# Patient Record
Sex: FEMALE | Race: WHITE | NOT HISPANIC OR LATINO | URBAN - METROPOLITAN AREA
[De-identification: names, ages, dates, MRNs, and addresses within clinical notes are randomized per-mention and may not be internally consistent; named-entity substitution may affect disease eponyms.]

---

## 2017-05-11 NOTE — PATIENT DISCUSSION
DRY EYES : Discussed with patient the importance of keeping the eye moist and the symptoms associated with dry eyes including blurry vision, tearing, burning, and pablo sensation. Advised patient to minimize use of any fans blowing directly on the face. Advised patient to continue with artificial tears 2-3 times daily.

## 2017-05-11 NOTE — PATIENT DISCUSSION
DRY EYES : Discussed with patient the importance of keeping the eye moist and the symptoms associated with dry eyes including blurry vision, tearing, burning, and pablo sensation. Tear Lab was performed today to evaluate the severity of dryness. Advised patient to minimize use of any fans blowing directly on the face. Advised patient to continue with over the counter artificial tears 2-3 times daily.

## 2017-12-07 ENCOUNTER — IMPORTED ENCOUNTER (OUTPATIENT)
Dept: URBAN - METROPOLITAN AREA CLINIC 31 | Facility: CLINIC | Age: 67
End: 2017-12-07

## 2017-12-07 PROBLEM — H25.13: Noted: 2017-12-07

## 2017-12-07 PROBLEM — E11.9: Noted: 2017-12-07

## 2017-12-07 PROCEDURE — 92015 DETERMINE REFRACTIVE STATE: CPT

## 2017-12-07 PROCEDURE — 92014 COMPRE OPH EXAM EST PT 1/>: CPT

## 2017-12-07 PROCEDURE — 76514 ECHO EXAM OF EYE THICKNESS: CPT

## 2017-12-07 NOTE — PATIENT DISCUSSION
1.  Refractive error2. DM II without sign of Diabetic Retinopathy OU:  Discussed the pathophysiology of diabetes and its effect on the eye. Stressed the importance of regular followup and good control of BS BP and Lipids to avoid future complications. 3. 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 4. Return for an appointment in 1 year for comprehensive exam. with Dr. Daphne Pritchett.

## 2018-04-30 NOTE — PATIENT DISCUSSION
Surgery  Counseling: I have discussed the option of glasses versus cataract surgery versus following. It was explained that when vision no longer meets the patient's visual needs and a new prescription for glasses is not likely to improve the patient's visual symptoms, the option of cataract surgery is a reasonable next step. It was explained that there is no guarantee that removing the cataract will improve their visual symptoms, however; it is believed that the cataract is contributing to the patient's visual impairment and surgery may significantly improve both the visual and functional status of the patient. The risks, benefits and alternatives of surgery were discussed with the patient. After this discussion, the patient desires to proceed with cataract surgery with implantation of an intraocular lens to improve vision for reading small print, reducing glare at night while driving and begin able to read small print.

## 2018-04-30 NOTE — PATIENT DISCUSSION
GLAUCOMA SUSPECT, OU OPTIC NERVE CUPPING/ASYMMETRY. IOP WNL , OU. OCT NEXT VISIT TO CHECK FOR RNFL THINNING. VISUAL FIELD NEXT VISIT TO CHECK FOR VISUAL FIELD LOSS.

## 2018-04-30 NOTE — PATIENT DISCUSSION
CATARACT, OU -VISUALLY SIGNIFICANT OU. SCHEDULE SX OD THEN LATER IN OS IF VISUAL SYMPTOMS PERSIST.  GLS RX GIVEN TO FILL IF DESIRES IN THE EVENT PT DOES NOT PROCEED W/ SX.

## 2018-04-30 NOTE — PATIENT DISCUSSION
Laterality Counseling: It has been explained that in the event cataract surgery is medically indicated in both eyes and each eye is equally visually significant the patient is offered to choose which eye he/she would prefer to be operated on first. If the patient does not have a preference, the surgery on the dominant eye will be performed first as a standard of care.

## 2018-06-07 NOTE — PATIENT DISCUSSION
Surgery  Counseling: I have discussed the option of glasses versus cataract surgery versus following . It was explained that when vision no longer meets the patient's visual needs and a new prescription for glasses is not likely to improve all of the patient's visual symptoms, the option of cataract surgery is a reasonable next step. It was explained that there is no guarantee that removing the cataract will improve their visual symptoms, however; it is believed that the cataract is contributing to the patient's visual impairment and surgery may significantly improve both the visual and functional status of the patient. The risks, benefits and alternatives of surgery were discussed with the patient. After this discussion, the patient desires to proceed with cataract surgery with implantation of an intraocular lens to improve vision to drive safely, read small print and watch TV. Joaquin David

## 2018-06-07 NOTE — PATIENT DISCUSSION
S/P PCIOL, OD- CONTINUE DROPS AS DIRECTED. OPERATIVE EYE IS STABLE AND OK TO PROCEED WITH THE FELLOW EYE'S SURGERY.

## 2018-06-07 NOTE — PATIENT DISCUSSION
***This patient had traditional cataract surgery performed. A monofocal IOL was placed to achieve a target refraction of plano (which should provide them with satisfactory distance vision with the aid of glasses/contact lenses). ***

## 2018-06-07 NOTE — PATIENT DISCUSSION
CATARACT, OS- VISUALLY SIGNIFICANT. FOLLOW UP WITH DR. ESCOTO TO [address] [facility] SX.

## 2019-02-27 ENCOUNTER — IMPORTED ENCOUNTER (OUTPATIENT)
Dept: URBAN - METROPOLITAN AREA CLINIC 31 | Facility: CLINIC | Age: 69
End: 2019-02-27

## 2019-02-27 PROBLEM — H25.13: Noted: 2019-02-27

## 2019-02-27 PROBLEM — H18.51: Noted: 2019-02-27

## 2019-02-27 PROCEDURE — 76514 ECHO EXAM OF EYE THICKNESS: CPT

## 2019-02-27 PROCEDURE — 92015 DETERMINE REFRACTIVE STATE: CPT

## 2019-02-27 PROCEDURE — 92014 COMPRE OPH EXAM EST PT 1/>: CPT

## 2019-02-27 NOTE — PATIENT DISCUSSION
1.  Discussed the risks benefits alternatives and limitations of cataract surgery including infection bleeding loss of vision retinal tears detachment. The patient stated a full understanding and a desire to proceed with the procedure in both eyes. Refractive options were reviewed. Patient has elected to be optimized for distance vision in both eyes. The patient will still need glasses for reading and to possibly fine tune distance vision. 2. Discussed the risks benefits and alternatives of DSEK/DMEK including infection bleeding loss of vision retineal tears detachment primary graft failure rejection and possible need for rebubble. Schedule if desired. 3. Refractive error - No glasses change. 4.  Return for an appointment in 1 month for cataract and cornea evaluation with Dr. Bubba Patterson.

## 2019-02-27 NOTE — PATIENT DISCUSSION
Discussed the risks benefits and alternatives of DSEK/DMEK including infection bleeding loss of vision retineal tears detachment primary graft failure rejection and possible need for rebubble. Schedule if desired.

## 2019-08-20 ENCOUNTER — IMPORTED ENCOUNTER (OUTPATIENT)
Dept: URBAN - METROPOLITAN AREA CLINIC 31 | Facility: CLINIC | Age: 69
End: 2019-08-20

## 2019-08-20 PROBLEM — H25.813: Noted: 2019-08-20

## 2019-08-20 PROBLEM — E11.9: Noted: 2019-08-20

## 2019-08-20 PROBLEM — H18.51: Noted: 2019-08-20

## 2019-08-20 PROCEDURE — 99214 OFFICE O/P EST MOD 30 MIN: CPT

## 2019-08-20 PROCEDURE — 92015 DETERMINE REFRACTIVE STATE: CPT

## 2019-08-20 PROCEDURE — 92286 ANT SGM IMG I&R SPECLR MIC: CPT

## 2019-08-20 NOTE — PATIENT DISCUSSION
1.  Discussed the risks benefits and alternatives of DSEK/DMEK including infection bleeding loss of vision retineal tears detachment primary graft failure rejection and possible need for rebubble. Schedule if desired. DMEK triple OD having issues with rapid pulse f/u with PCP/cardiology to ensure stable prior to surgery. Pore -0.752. Discussed the risks benefits alternatives and limitations of cataract surgery including infection bleeding loss of vision retinal tears detachment. The patient stated a full understanding and a desire to proceed with the procedure in both eyes. Refractive options were reviewed. Patient has elected to be optimized for distance vision in both eyes. The patient will still need glasses for reading and to possibly fine tune distance vision. 3.  DM II without sign of Diabetic Retinopathy OU:  Discussed the pathophysiology of diabetes and its effect on the eye. Stressed the importance of regular followup and good control of BS BP and Lipids to avoid future complications. 4. Return for an appointment for [address] [place] [address] with Dr. Tod Dupree.

## 2020-09-09 ENCOUNTER — IMPORTED ENCOUNTER (OUTPATIENT)
Dept: URBAN - METROPOLITAN AREA CLINIC 31 | Facility: CLINIC | Age: 70
End: 2020-09-09

## 2020-09-09 PROBLEM — E11.9: Noted: 2020-09-09

## 2020-09-09 PROBLEM — H25.811: Noted: 2020-09-09

## 2020-09-09 PROBLEM — H25.812: Noted: 2020-09-09

## 2020-09-09 PROBLEM — H18.51: Noted: 2020-09-09

## 2020-09-09 PROBLEM — H25.813: Noted: 2020-09-09

## 2020-09-09 PROBLEM — H18.231: Noted: 2020-09-09

## 2020-09-09 PROCEDURE — 92014 COMPRE OPH EXAM EST PT 1/>: CPT

## 2020-09-09 NOTE — PATIENT DISCUSSION
Fuchs Dystrophy OS: Recommend clinical observation. Advised use of Randy 128 drops in affected eye if worsening.

## 2020-09-09 NOTE — PATIENT DISCUSSION
1.  Fuchs Dyst OD : Discussed the risks benefits and alternatives of DSEK/DMEK including infection bleeding loss of vision retineal tears detachment primary graft failure rejection and possible need for rebubble. Schedule Triple DMEK ODPore -0.752. Combined Types of Cataract OD: Discussed the risks benefits alternatives and limitations of cataract surgery including infection bleeding loss of vision retinal tears detachment. The patient stated a full understanding and a desire to proceed with the procedure in the right eye. Refractive options were reviewed. Patient has elected to be optimized for distance vision in the right eye. The patient will still need glasses for reading and to fine tune distance vision. 3. Fuchs Dystrophy OS: Recommend clinical observation. Advised use of Randy 128 drops in affected eye if worsening. 4.  DM II without sign of Diabetic Retinopathy OU:  Discussed the pathophysiology of diabetes and its effect on the eye. Stressed the importance of regular followup and good control of BS BP and Lipids to avoid future complications. 5. Combined Types of Cataract OS: Explained how cataracts can effect vision. Recommend clinical observation. The patient was advised to contact us if any change or worsening of vision. 6. Followup for Admit. with Dr. Purnima Rodriguez

## 2020-09-18 ENCOUNTER — IMPORTED ENCOUNTER (OUTPATIENT)
Dept: URBAN - METROPOLITAN AREA CLINIC 31 | Facility: CLINIC | Age: 70
End: 2020-09-18

## 2020-09-18 PROBLEM — H18.51: Noted: 2020-09-18

## 2020-09-18 PROBLEM — H25.811: Noted: 2020-09-18

## 2020-09-18 PROCEDURE — 92136 OPHTHALMIC BIOMETRY: CPT

## 2020-09-18 NOTE — PATIENT DISCUSSION
1.  Discussed the risks benefits and alternatives of DSEK/DMEK including infection bleeding loss of vision retineal tears detachment primary graft failure rejection and possible need for rebubble. Schedule if desired. 2. Combined Types of Cataract OD: Discussed the risks benefits alternatives and limitations of cataract surgery including infection bleeding loss of vision retinal tears detachment. The patient stated a full understanding and a desire to proceed with the procedure in the right eye. Refractive options were reviewed. Patient has elected to be optimized for distance vision in the right eye. The patient will still need glasses for reading and to possibly fine tune distance vision.

## 2020-10-16 ENCOUNTER — IMPORTED ENCOUNTER (OUTPATIENT)
Dept: URBAN - METROPOLITAN AREA CLINIC 31 | Facility: CLINIC | Age: 70
End: 2020-10-16

## 2020-10-28 ENCOUNTER — IMPORTED ENCOUNTER (OUTPATIENT)
Dept: URBAN - METROPOLITAN AREA CLINIC 31 | Facility: CLINIC | Age: 70
End: 2020-10-28

## 2020-10-28 PROBLEM — Z96.1: Noted: 2020-10-28

## 2020-10-28 PROBLEM — Z94.7: Noted: 2020-10-28

## 2020-10-28 PROCEDURE — 99024 POSTOP FOLLOW-UP VISIT: CPT

## 2020-10-28 NOTE — PATIENT DISCUSSION
Post-Op Doing well no heavy lifting or straining glasses or shield at all times no eye rubbing po drops as directed. Call with any problems.

## 2020-10-28 NOTE — PATIENT DISCUSSION
Post-Op Day #1 - Cataract Surgery Right Eye (OD) - doing well. Tears prn. Continue postop drops as directed. Call office with symptoms of pain redness or decreased vision in operative eye. 1 drop tobramycin instilled.

## 2020-11-02 ENCOUNTER — IMPORTED ENCOUNTER (OUTPATIENT)
Dept: URBAN - METROPOLITAN AREA CLINIC 31 | Facility: CLINIC | Age: 70
End: 2020-11-02

## 2020-11-02 PROBLEM — Z94.7: Noted: 2020-11-02

## 2020-11-02 PROBLEM — Z96.1: Noted: 2020-11-02

## 2020-11-02 PROCEDURE — 92132 CPTRZD OPH DX IMG ANT SGM: CPT

## 2020-11-02 PROCEDURE — 99024 POSTOP FOLLOW-UP VISIT: CPT

## 2020-11-02 NOTE — PATIENT DISCUSSION
Return for an appointment in 4 days for post op exam. OCT anterior segment. with Dr. Azeem Contreras.

## 2020-11-02 NOTE — PATIENT DISCUSSION
1.  Post-Op DMEK OD : Doing well no heavy lifting or straining glasses or shield at all times no eye rubbing po drops as directed. Call with any problems. 2. Post-Op Week #1 - Cataract Surgery Right Eye (OD) - Intraocular lens stable and surgery very well healed. Patient to resume all normal activities. Finish postop drops as directed. Final Refraction given if necessary. Call with any problems. 3. Return for an appointment in 4 days for post op exam. OCT anterior segment. with Dr. Purnima Rodriguez

## 2020-11-06 ENCOUNTER — IMPORTED ENCOUNTER (OUTPATIENT)
Dept: URBAN - METROPOLITAN AREA CLINIC 31 | Facility: CLINIC | Age: 70
End: 2020-11-06

## 2020-11-06 PROBLEM — Z94.7: Noted: 2020-11-06

## 2020-11-06 PROCEDURE — 92132 CPTRZD OPH DX IMG ANT SGM: CPT

## 2020-11-06 PROCEDURE — 99024 POSTOP FOLLOW-UP VISIT: CPT

## 2020-11-06 NOTE — PATIENT DISCUSSION
Post-Op graft mostly attached focal detachment inferior. Monitor t/c rebubble if not better. no heavy lifting or straining glasses or shield at all times no eye rubbing po drops as directed. Call with any problems. Return for an appointment in 1 week for post op exam. OCT anterior segment. with Dr. Yousuf Tejada.

## 2020-11-11 ENCOUNTER — IMPORTED ENCOUNTER (OUTPATIENT)
Dept: URBAN - METROPOLITAN AREA CLINIC 31 | Facility: CLINIC | Age: 70
End: 2020-11-11

## 2020-11-11 PROBLEM — Z94.7: Noted: 2020-11-11

## 2020-11-11 PROCEDURE — 92132 CPTRZD OPH DX IMG ANT SGM: CPT

## 2020-11-11 PROCEDURE — 99024 POSTOP FOLLOW-UP VISIT: CPT

## 2020-11-11 NOTE — PATIENT DISCUSSION
Post-Op Focal detachment inferior. no heavy lifting or straining glasses or shield at all times no eye rubbing Option of rebubble discussed risks and benefits reviewed plan today. po drops as directed. Call with any problems. Return for an appointment in 2 weeks for post op exam. OCT anterior segment. with Dr. Camilla Crigler.

## 2020-12-02 ENCOUNTER — IMPORTED ENCOUNTER (OUTPATIENT)
Dept: URBAN - METROPOLITAN AREA CLINIC 31 | Facility: CLINIC | Age: 70
End: 2020-12-02

## 2020-12-02 PROBLEM — H40.013: Noted: 2020-12-02

## 2020-12-02 PROBLEM — Z94.7: Noted: 2020-12-02

## 2020-12-02 PROBLEM — H18.513: Noted: 2020-12-02

## 2020-12-02 PROBLEM — H25.812: Noted: 2020-12-02

## 2020-12-02 PROBLEM — H43.811: Noted: 2020-12-02

## 2020-12-02 PROBLEM — Z96.1: Noted: 2020-12-02

## 2020-12-02 PROBLEM — H43.812: Noted: 2020-12-02

## 2020-12-02 PROBLEM — E11.3292: Noted: 2020-12-02

## 2020-12-02 PROCEDURE — 92132 CPTRZD OPH DX IMG ANT SGM: CPT

## 2020-12-02 PROCEDURE — 99024 POSTOP FOLLOW-UP VISIT: CPT

## 2020-12-02 PROCEDURE — 92134 CPTRZ OPH DX IMG PST SGM RTA: CPT

## 2020-12-02 NOTE — PATIENT DISCUSSION
Fuchs Dystrophy OU: Recommend clinical observation. Advised use of Randy 128 drops in affected eye if worsening.

## 2020-12-02 NOTE — PATIENT DISCUSSION
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 Stressed the importance of regular followup and good control of BS BP and Lipids to avoid future complications.

## 2020-12-02 NOTE — PATIENT DISCUSSION
Postop doing well. Lie on back face up position as directed. Glasses or shield all times no heavy lifting. No eye rubbing po drops as directed. Call with any problems.

## 2020-12-02 NOTE — PATIENT DISCUSSION
1.  1.  Post-Op DMEK OD:Doing well still some edema but now 100% attached. no heavy lifting or straining glasses or shield at all times no eye rubbing. Pred QID to TID in 1 wk till seen as directed. Call with any .problems. SR today moxifloxacin for 3 daysReturn for an appointment in 3-4 weeks for post op refraction. with Dr. Fredi Olivas. 2.  Fuchs Dystrophy OU: Recommend clinical observation. Advised use of Randy 128 drops in affected eye if worsening. 3. Combined Types of Cataract OS: Explained how cataracts can effect vision. Recommend clinical observation. The patient was advised to contact us if any change or worsening of vision. 4. Glaucoma suspect OU - No signs of glaucoma starting based on todays examination and testing. Will continue to monitor for glaucoma development. 5. PVD OD ?: Patient was cautioned to call our office immediately if they experience a substantial change in their symptoms such as an increase in floaters persistent flashes loss of visual field (may appear as a shadow or a curtain) or decrease in visual acuity as these may indicate a retinal tear or detachment. If this is a new problem patient will need to return for re-examination  as determined by the [facility].   DM II with mild Non-proliferative Diabetic Retinopathy OS:  OCT mac todayFEP 1 month

## 2020-12-23 ENCOUNTER — IMPORTED ENCOUNTER (OUTPATIENT)
Dept: URBAN - METROPOLITAN AREA CLINIC 31 | Facility: CLINIC | Age: 70
End: 2020-12-23

## 2020-12-23 PROBLEM — Z98.89: Noted: 2020-12-23

## 2020-12-23 PROCEDURE — 99024 POSTOP FOLLOW-UP VISIT: CPT

## 2020-12-23 NOTE — PATIENT DISCUSSION
Corneal transplant DMEK OD - Doing well po drops as instructed. Call with any problems.  continue slow taper of PREDRisks and benefits of surgery reviewed Schedule DMEK triple OS pore -0.50

## 2021-01-29 ENCOUNTER — IMPORTED ENCOUNTER (OUTPATIENT)
Dept: URBAN - METROPOLITAN AREA CLINIC 31 | Facility: CLINIC | Age: 71
End: 2021-01-29

## 2021-01-29 PROBLEM — Z94.7: Noted: 2021-01-29

## 2021-01-29 PROCEDURE — 99024 POSTOP FOLLOW-UP VISIT: CPT

## 2021-01-29 NOTE — PATIENT DISCUSSION
Post-Op DMEK KPE-IOL OD signs and symptoms of rejection discussed card given rejection vaccinationDoing well Wants to wait till at least April to do OS. Final MR given. Call with any problems. Return for an appointment in 4 months for dilated fundus exam. with Dr. Rocio Peters.

## 2021-05-14 NOTE — PATIENT DISCUSSION
S/P YAG OU - DOING WELL, PATIENT ED ON SMALL RX OS&gt;OD. NEW GLS RX GIVEN, WEAR PRN. MONITOR X 1 YEAR.

## 2021-06-04 ENCOUNTER — IMPORTED ENCOUNTER (OUTPATIENT)
Dept: URBAN - METROPOLITAN AREA CLINIC 31 | Facility: CLINIC | Age: 71
End: 2021-06-04

## 2021-06-04 PROBLEM — E11.9: Noted: 2021-06-04

## 2021-06-04 PROBLEM — Z94.7: Noted: 2021-06-04

## 2021-06-04 PROBLEM — Z96.1: Noted: 2021-06-04

## 2021-06-04 PROBLEM — H18.512: Noted: 2021-06-04

## 2021-06-04 PROBLEM — H25.812: Noted: 2021-06-04

## 2021-06-04 PROCEDURE — 92015 DETERMINE REFRACTIVE STATE: CPT

## 2021-06-04 PROCEDURE — 99214 OFFICE O/P EST MOD 30 MIN: CPT

## 2021-06-04 NOTE — PATIENT DISCUSSION
Fuchs Dystrophy OS: Recommend clinical observation. Advised use of Randy 128 drops in affected eye if worsening. Discussed option of DMEK triple OS when pt is ready to proceed.

## 2021-06-04 NOTE — PATIENT DISCUSSION
1.  Transplant Doing well after transplant. Continue topical steroids. PRED qd Rejection and vaccinations reviewed. 2. Fuchs Dystrophy OS: Recommend clinical observation. Advised use of Randy 128 drops in affected eye if worsening. Discussed option of DMEK triple OS when pt is ready to proceed. 3. Pseudophakia OD - IOL stable. Monitor for changes in vision. 4. Combined Types of Cataract OS: Explained how cataracts can effect vision. Recommend clinical observation. The patient was advised to contact us if any change or worsening of vision. 5.  DM II without sign of Diabetic Retinopathy OU:  Discussed the pathophysiology of diabetes and its effect on the eye. Stressed the importance of regular followup and good control of BS BP and Lipids to avoid future complications. 6. Return for an appointment in 6 months for office call. with Dr. Kaylie Albright.

## 2021-11-30 NOTE — PATIENT DISCUSSION
Glaucoma Suspect/iStent Counseling:  A glaucoma suspect is a person with one or more risk factors that may lead to glaucoma. I have explained to the patient that glaucoma potentially causes loss of peripheral vision due to damage to the optic nerve that is irreversible. I have discussed the option of an iStent micro-invasive glaucoma surgery device at the time of cataract surgery should further evaluation indicate glaucoma is present. Patient understands and will decide on iStent insertion pending visual field results. Infectious disease/Event Note Infectious disease/Event Note

## 2021-12-06 ENCOUNTER — IMPORTED ENCOUNTER (OUTPATIENT)
Dept: URBAN - METROPOLITAN AREA CLINIC 31 | Facility: CLINIC | Age: 71
End: 2021-12-06

## 2021-12-06 PROBLEM — H25.812: Noted: 2021-12-06

## 2021-12-06 PROBLEM — Z96.1: Noted: 2021-12-06

## 2021-12-06 PROBLEM — Z94.7: Noted: 2021-12-06

## 2021-12-06 PROBLEM — H04.123: Noted: 2021-12-06

## 2021-12-06 PROBLEM — INACTIVE: Noted: 2021-12-06

## 2021-12-06 PROBLEM — H18.512: Noted: 2021-12-06

## 2021-12-06 PROBLEM — H18.513: Noted: 2021-12-06

## 2021-12-06 PROCEDURE — 99213 OFFICE O/P EST LOW 20 MIN: CPT

## 2021-12-06 NOTE — PATIENT DISCUSSION
Fuchs Dystrophy OS: worsening happy with vision for now she will let us know when she wants to proceed.

## 2021-12-06 NOTE — PATIENT DISCUSSION
1.  Transplant Doing well after transplant. Continue topical steroids. Rejection and vaccinations reviewed. 2. Dry Eyes OU:  Start artificials tears. Encouraged regular use. 3.  Fuchs Dystrophy OS: worsening happy with vision for now she will let us know when she wants to proceed. 4. Pseudophakia OD - IOL stable. Monitor for changes in vision. 5. Combined Types of Cataract OS: Explained how cataracts can effect vision. Recommend clinical observation. The patient was advised to contact us if any change or worsening of vision. 6. Return for an appointment in 6 months for comprehensive exam. MRx Topography and BAT. with Dr. Chandler Schreiber.

## 2021-12-06 NOTE — PATIENT DISCUSSION
Return for an appointment in 6 months for comprehensive exam. MRx Topography and BAT. with Dr. Yousuf Tejada.

## 2022-04-02 ASSESSMENT — VISUAL ACUITY
OS_SC: 20/25+2
OD_SC: 20/40-2
OD_PH: CC 20/25
OS_SC: 20/30+2
OD_CC: J1
OD_SC: 20/40-2
OS_CC: 20/80
OD_CC: 20/70+2
OS_SC: 20/25-2
OD_SC: 20/30
OD_CC: 20/25+1
OS_CC: 20/60-2
OD_SC: 20/40+2
OD_SC: 20/30-1
OS_GLARE: 20/50MED
OS_SC: 20/30+2
OS_CC: J2
OS_CC: 20/80
OD_CC: 20/30+2
OD_PH: CC 20/25 +2
OS_SC: 20/30
OS_CC: J1+
OD_SC: 20/20-3
OS_SC: 20/25
OD_CC: CF@1'
OS_CC: J1
OD_GLARE: 20/50MED
OS_SC: 20/30-2
OD_PH: CC 20/20 -2
OD_PH: SC 20/30 -2
OD_CC: CF@1'
OU_SC: J514''
OD_CC: 20/70
OS_PH: CC 20/25 -2
OD_CC: 20/25-2
OS_CC: 20/40+2
OD_SC: J514''
OS_SC: 20/30+2
OS_PH: SC 20/20 -2
OS_SC: 20/30+1
OD_CC: 20/70-1
OD_SC: 20/40
OD_CC: 20/50+2
OS_SC: J314''
OS_GLARE: 20/400
OU_CC: J116''
OS_SC: 20/25-2
OD_CC: 20/30
OD_CC: J2
OS_SC: 20/20-3
OS_CC: 20/60
OD_PH: SC 20/30 -2
OD_CC: 20/60+1

## 2022-04-02 ASSESSMENT — TONOMETRY
OS_IOP_MMHG: 11
OD_IOP_MMHG: 10
OD_IOP_MMHG: 12
OS_IOP_MMHG: 7
OS_IOP_MMHG: 9
OS_IOP_MMHG: 8
OS_IOP_MMHG: 10
OD_IOP_MMHG: 9
OD_IOP_MMHG: 8
OS_IOP_MMHG: 12
OD_IOP_MMHG: 10
OS_IOP_MMHG: 10
OD_IOP_MMHG: 11
OD_IOP_MMHG: 9
OS_IOP_MMHG: 14
OD_IOP_MMHG: 10
OD_IOP_MMHG: 6

## 2022-07-08 ENCOUNTER — PREPPED CHART (OUTPATIENT)
Dept: URBAN - METROPOLITAN AREA CLINIC 29 | Facility: CLINIC | Age: 72
End: 2022-07-08

## 2022-07-08 NOTE — PATIENT DISCUSSION
Return for an appointment in 6 months for comprehensive exam. MRx Topography and BAT. with Dr. Azeem Contreras.

## 2022-07-08 NOTE — PATIENT DISCUSSION
1.  Transplant Doing well after transplant. Continue topical steroids. Rejection and vaccinations reviewed. 2. Dry Eyes OU:  Start artificials tears. Encouraged regular use. 3.  Fuchs Dystrophy OS: worsening happy with vision for now she will let us know when she wants to proceed. 4. Pseudophakia OD - IOL stable. Monitor for changes in vision. 5. Combined Types of Cataract OS: Explained how cataracts can effect vision. Recommend clinical observation. The patient was advised to contact us if any change or worsening of vision. 6. Return for an appointment in 6 months for comprehensive exam. MRx Topography and BAT. with Dr. Flavio Frankel.

## 2022-07-09 ENCOUNTER — TELEPHONE ENCOUNTER (OUTPATIENT)
Dept: URBAN - METROPOLITAN AREA CLINIC 121 | Facility: CLINIC | Age: 72
End: 2022-07-09

## 2022-07-09 RX ORDER — ROSUVASTATIN CALCIUM 20 MG
TABLET ORAL
Refills: 0 | OUTPATIENT
Start: 2014-03-25 | End: 2017-11-07

## 2022-07-09 RX ORDER — METFORMIN HCL 500 MG/1
TABLET ORAL
Refills: 0 | OUTPATIENT
Start: 2014-03-25 | End: 2017-11-07

## 2022-07-09 RX ORDER — BUPROPION HYDROCHLORIDE 300 MG/1
TABLET, EXTENDED RELEASE ORAL
Refills: 0 | OUTPATIENT
Start: 2014-03-25 | End: 2017-11-07

## 2022-07-09 RX ORDER — UBIDECARENONE/VIT E ACET 100MG-5
CAPSULE ORAL
Refills: 0 | OUTPATIENT
Start: 2014-03-25 | End: 2017-11-07

## 2022-07-10 ENCOUNTER — TELEPHONE ENCOUNTER (OUTPATIENT)
Dept: URBAN - METROPOLITAN AREA CLINIC 121 | Facility: CLINIC | Age: 72
End: 2022-07-10

## 2022-07-10 RX ORDER — METFORMIN HCL 1000 MG/1
TABLET ORAL TWICE A DAY
Refills: 0 | Status: ACTIVE | COMMUNITY
Start: 2017-11-07

## 2022-07-10 RX ORDER — METFORMIN HCL 500 MG/1
TABLET ORAL
Refills: 0 | Status: ACTIVE | COMMUNITY
Start: 2017-11-07

## 2022-07-10 RX ORDER — COLESEVELAM HYDROCHLORIDE 625 MG/1
TABLET, FILM COATED ORAL TWICE A DAY
Refills: 0 | Status: ACTIVE | COMMUNITY
Start: 2017-11-07

## 2022-07-10 RX ORDER — ASPIRIN 81 MG/1
TABLET, CHEWABLE ORAL ONCE A DAY
Refills: 0 | Status: ACTIVE | COMMUNITY
Start: 2017-11-07

## 2022-07-10 RX ORDER — VENLAFAXINE HYDROCHLORIDE 75 MG/1
TABLET, EXTENDED RELEASE ORAL ONCE A DAY
Refills: 0 | Status: ACTIVE | COMMUNITY
Start: 2017-11-07

## 2022-07-10 RX ORDER — ATORVASTATIN CALCIUM 20 MG/1
TABLET, FILM COATED ORAL ONCE A DAY
Refills: 0 | Status: ACTIVE | COMMUNITY
Start: 2017-11-07

## 2022-07-11 ENCOUNTER — ESTABLISHED PATIENT (OUTPATIENT)
Dept: URBAN - METROPOLITAN AREA CLINIC 29 | Facility: CLINIC | Age: 72
End: 2022-07-11

## 2022-07-11 DIAGNOSIS — H25.812: ICD-10-CM

## 2022-07-11 DIAGNOSIS — E11.9: ICD-10-CM

## 2022-07-11 DIAGNOSIS — H18.512: ICD-10-CM

## 2022-07-11 DIAGNOSIS — H04.123: ICD-10-CM

## 2022-07-11 DIAGNOSIS — Z96.1: ICD-10-CM

## 2022-07-11 DIAGNOSIS — Z94.7: ICD-10-CM

## 2022-07-11 PROCEDURE — 92014 COMPRE OPH EXAM EST PT 1/>: CPT

## 2022-07-11 PROCEDURE — 92025 CPTRIZED CORNEAL TOPOGRAPHY: CPT

## 2022-07-11 ASSESSMENT — VISUAL ACUITY
OS_CC: 20/60
OD_SC: 20/60+2
OS_SC: 20/400
OS_BAT: 20/60
OD_CC: 20/30

## 2022-07-11 ASSESSMENT — TONOMETRY
OS_IOP_MMHG: 9
OD_IOP_MMHG: 9

## 2022-07-11 NOTE — PATIENT DISCUSSION
The patient has evidence of both cataract and Fuchs’ corneal endothelial dystrophy. Each is contributing to the patient's visual complaints. Fuchs’ corneal endothelial dystrophy is a progressive inherited disease in which corneal endothelial dysfunction results in corneal edema. Unfortunately, the trauma of cataract surgery is likely to further decompensate the cornea. I have recommended elective cataract extraction combined with corneal transplantation. I have extensively discussed with the patient the associated risks including infection, hemorrhage, and rejection of the transplant. Typically, the recovery of vision is slow over the course of several months.  Schedule DMEK triple OS pore -0.50, paperwork onlyStart Randy 128 drops 3x/d to reduce edema and help with visualization for surgery.

## 2022-08-09 NOTE — PATIENT DISCUSSION
Restasis BID Continue and also artificial tears. Placement of punctal plugs today in both lower puncta to improve blurriness and ocular discomfort.

## 2022-08-09 NOTE — PROCEDURE NOTE: CLINICAL
PROCEDURE NOTE: Punctal Plugs, Extended OU. Diagnosis: Dry Eye Syndrome. Prior to treatment, the risks/benefits/alternatives were discussed. The patient wished to proceed with procedure. Extended duration plugs were inserted. Brand: Dura. Size: 0.3mm Location: both lower  punctum. Patient tolerated procedure well. There were no complications. Post procedure instructions given. Farhat Adams

## 2022-08-15 ENCOUNTER — DASHBOARD ENCOUNTERS (OUTPATIENT)
Age: 72
End: 2022-08-15

## 2022-08-16 ENCOUNTER — OFFICE VISIT (OUTPATIENT)
Dept: URBAN - METROPOLITAN AREA CLINIC 60 | Facility: CLINIC | Age: 72
End: 2022-08-16

## 2022-08-16 RX ORDER — COLESEVELAM HYDROCHLORIDE 625 MG/1
TABLET, FILM COATED ORAL TWICE A DAY
Refills: 0 | COMMUNITY
Start: 2017-11-07

## 2022-08-16 RX ORDER — METFORMIN HCL 500 MG/1
TABLET ORAL
Refills: 0 | COMMUNITY
Start: 2017-11-07

## 2022-08-16 RX ORDER — VENLAFAXINE HYDROCHLORIDE 75 MG/1
TABLET, EXTENDED RELEASE ORAL ONCE A DAY
Refills: 0 | COMMUNITY
Start: 2017-11-07

## 2022-08-16 RX ORDER — METFORMIN HCL 1000 MG/1
TABLET ORAL TWICE A DAY
Refills: 0 | COMMUNITY
Start: 2017-11-07

## 2022-08-16 RX ORDER — ATORVASTATIN CALCIUM 20 MG/1
TABLET, FILM COATED ORAL ONCE A DAY
Refills: 0 | COMMUNITY
Start: 2017-11-07

## 2022-08-16 RX ORDER — ASPIRIN 81 MG/1
TABLET, CHEWABLE ORAL ONCE A DAY
Refills: 0 | COMMUNITY
Start: 2017-11-07

## 2022-08-30 ENCOUNTER — SURGERY/PROCEDURE (OUTPATIENT)
Dept: URBAN - METROPOLITAN AREA SURGERY 17 | Facility: SURGERY | Age: 72
End: 2022-08-30

## 2022-08-30 DIAGNOSIS — H18.512: ICD-10-CM

## 2022-08-30 DIAGNOSIS — H25.812: ICD-10-CM

## 2022-08-30 PROCEDURE — 65756 CORNEAL TRNSPL ENDOTHELIAL: CPT

## 2022-08-30 PROCEDURE — 66984 XCAPSL CTRC RMVL W/O ECP: CPT

## 2022-08-31 ENCOUNTER — POST-OP (OUTPATIENT)
Dept: URBAN - METROPOLITAN AREA CLINIC 29 | Facility: CLINIC | Age: 72
End: 2022-08-31

## 2022-08-31 DIAGNOSIS — Z98.890: ICD-10-CM

## 2022-08-31 PROCEDURE — 66999PO NON CO-MANAGED OTHER SURGERY PO

## 2022-08-31 RX ORDER — BROMFENAC SODIUM 0.7 MG/ML: 1 SOLUTION/ DROPS OPHTHALMIC ONCE A DAY

## 2022-08-31 RX ORDER — PREDNISOLONE ACETATE 10 MG/ML: 1 SUSPENSION/ DROPS OPHTHALMIC

## 2022-08-31 ASSESSMENT — VISUAL ACUITY
OD_SC: 20/30
OS_SC: 20/400

## 2022-09-06 ENCOUNTER — POST-OP (OUTPATIENT)
Dept: URBAN - METROPOLITAN AREA CLINIC 29 | Facility: CLINIC | Age: 72
End: 2022-09-06

## 2022-09-06 DIAGNOSIS — Z98.890: ICD-10-CM

## 2022-09-06 DIAGNOSIS — Z94.7: ICD-10-CM

## 2022-09-06 PROCEDURE — 92132 CPTRZD OPH DX IMG ANT SGM: CPT

## 2022-09-06 PROCEDURE — 66999PO NON CO-MANAGED OTHER SURGERY PO

## 2022-09-06 ASSESSMENT — VISUAL ACUITY
OS_SC: 20/30-2
OD_CC: 20/30+2
OS_CC: 20/30-2

## 2022-09-14 ENCOUNTER — POST-OP (OUTPATIENT)
Dept: URBAN - METROPOLITAN AREA CLINIC 29 | Facility: CLINIC | Age: 72
End: 2022-09-14

## 2022-09-14 DIAGNOSIS — Z94.7: ICD-10-CM

## 2022-09-14 DIAGNOSIS — Z98.890: ICD-10-CM

## 2022-09-14 PROCEDURE — 92132 CPTRZD OPH DX IMG ANT SGM: CPT

## 2022-09-14 PROCEDURE — 66999PO NON CO-MANAGED OTHER SURGERY PO

## 2022-09-14 ASSESSMENT — VISUAL ACUITY
OD_CC: 20/25-2
OS_CC: 20/30-1
OS_SC: 20/30+2
OD_SC: 20/40-2

## 2022-09-14 NOTE — PATIENT DISCUSSION
Small area on Ant segment OCT peripherally were graft not fully attached. Discussed Re-bubble or waiting to see if it attaches on its own. Pt does not have  with her today and will re-examine next week for possible -Re-Bubble. Pt to have  with her that day.

## 2022-09-21 ENCOUNTER — POST-OP (OUTPATIENT)
Dept: URBAN - METROPOLITAN AREA CLINIC 29 | Facility: CLINIC | Age: 72
End: 2022-09-21

## 2022-09-21 DIAGNOSIS — Z94.7: ICD-10-CM

## 2022-09-21 DIAGNOSIS — Z98.890: ICD-10-CM

## 2022-09-21 PROCEDURE — 92132 CPTRZD OPH DX IMG ANT SGM: CPT

## 2022-09-21 PROCEDURE — 66999PO NON CO-MANAGED OTHER SURGERY PO

## 2022-09-21 ASSESSMENT — VISUAL ACUITY
OS_CC: 20/30-1
OD_CC: 20/25-2

## 2022-09-21 NOTE — PATIENT DISCUSSION
Post op instructions reviewed with patients including the use of drops. Report give to brandy rn. Chews of 0. PIV patent and infusing. Jessica quintero rn to go up with pt

## 2022-09-21 NOTE — PATIENT DISCUSSION
Small area on Ant segment OCT peripherally were graft not fully attached. Risks and benefits of rebubble discussed plan today.

## 2022-09-21 NOTE — PROCEDURE NOTE: CLINICAL
PROCEDURE NOTE: Installation of An Air Bubble or Gas in the Anterior Chamber to Reposition the Endothelial Tissue OD. Diagnosis: Corneal Transplant. After the risks, benefits , and alternatives were discussed , the patient agreed and consented to the injection of an air bubble to reposition a partially detached endothelial graft. A time out was performed and the correct patient, procedure , and side were verified. A drop of proparacaine 1% was instilled. A lid speculum was placed. A TB syringe was connected to a 30 gauge needle through a filter and used to draw up 0.5-1cc of filtered air. At the slit lamp, the sterile needle was used to penetrate the cornea near the limbus and inject a ~0.2cc air bubble into the anterior chamber. The speculum was removed and the patient was reclined horizontally and instructed to lay flat for as long as possible over the next 24 hours. The needle entry site was checked for leaks and none were found. The intraocular pressure was checked and found to be within acceptable range and the patient tolerated the procedure well with no complications. An antibiotic drop was placed, a follow-up appointment was made, and all questions were answered prior to discharging the patient. Yoamira Jones

## 2022-09-23 ENCOUNTER — POST-OP (OUTPATIENT)
Dept: URBAN - METROPOLITAN AREA CLINIC 29 | Facility: CLINIC | Age: 72
End: 2022-09-23

## 2022-09-23 DIAGNOSIS — H04.123: ICD-10-CM

## 2022-09-23 DIAGNOSIS — Z96.1: ICD-10-CM

## 2022-09-23 DIAGNOSIS — Z94.7: ICD-10-CM

## 2022-09-23 DIAGNOSIS — Z98.890: ICD-10-CM

## 2022-09-23 PROCEDURE — 92132 CPTRZD OPH DX IMG ANT SGM: CPT

## 2022-09-23 PROCEDURE — 66999PO NON CO-MANAGED OTHER SURGERY PO

## 2022-09-23 ASSESSMENT — VISUAL ACUITY
OD_CC: 20/25-2
OS_CC: 20/40-2
OS_PH: 20/25-3

## 2022-09-28 ENCOUNTER — OFFICE VISIT (OUTPATIENT)
Dept: URBAN - METROPOLITAN AREA CLINIC 63 | Facility: CLINIC | Age: 72
End: 2022-09-28

## 2022-10-07 ENCOUNTER — POST-OP (OUTPATIENT)
Dept: URBAN - METROPOLITAN AREA CLINIC 29 | Facility: CLINIC | Age: 72
End: 2022-10-07

## 2022-10-07 DIAGNOSIS — Z98.890: ICD-10-CM

## 2022-10-07 PROCEDURE — 66999PO NON CO-MANAGED OTHER SURGERY PO

## 2022-10-07 ASSESSMENT — VISUAL ACUITY
OS_PH: 20/40+2
OS_SC: 20/60-2
OD_SC: 20/30-1

## 2022-11-02 ENCOUNTER — POST-OP (OUTPATIENT)
Dept: URBAN - METROPOLITAN AREA CLINIC 29 | Facility: CLINIC | Age: 72
End: 2022-11-02

## 2022-11-02 DIAGNOSIS — Z96.1: ICD-10-CM

## 2022-11-02 DIAGNOSIS — H04.123: ICD-10-CM

## 2022-11-02 DIAGNOSIS — E11.9: ICD-10-CM

## 2022-11-02 DIAGNOSIS — Z98.890: ICD-10-CM

## 2022-11-02 PROCEDURE — 66999PO NON CO-MANAGED OTHER SURGERY PO

## 2022-11-02 ASSESSMENT — VISUAL ACUITY
OS_CC: 20/40
OS_PH: 20/40
OD_SC: 20/40
OS_SC: 20/60
OD_CC: 20/40

## 2022-11-02 ASSESSMENT — TONOMETRY
OS_IOP_MMHG: 12
OD_IOP_MMHG: 12

## 2022-11-02 NOTE — PATIENT DISCUSSION
Post op instructions reviewed with patients including the use of drops. PRED 3x/d for 2 weeks then 2x/d.

## 2022-12-08 NOTE — PROCEDURE NOTE: CLINICAL
PROCEDURE NOTE: Punctal Plugs, Extended OU. Diagnosis: Dry Eye Syndrome. Prior to treatment, the risks/benefits/alternatives were discussed. The patient wished to proceed with procedure. Extended duration plugs were inserted. Brand: dura. Size: 0.3mm Location: both lower punctum. Patient tolerated procedure well. There were no complications. Post procedure instructions given. Harriet Graves

## 2023-02-23 NOTE — PATIENT DISCUSSION
1.  Discussed the risks benefits and alternatives of DSEK/DMEK including infection bleeding loss of vision retineal tears detachment primary graft failure rejection and possible need for rebubble. Schedule DMEK triple OD f/u with PCP/cardiology to ensure stable prior to surgery. Pore -0.752. Combined Cataract OU: Discussed the risks benefits alternatives and limitations of cataract surgery including infection bleeding loss of vision retinal tears detachment. The patient stated a full understanding and a desire to proceed with the procedure in both eyes. Refractive options were reviewed. Patient has elected to be optimized for distance vision in both eyes. The patient will still need glasses for reading and to possibly fine tune distance vision. 3.  DM II without sign of Diabetic Retinopathy OU:  Discussed the pathophysiology of diabetes and its effect on the eye. Stressed the importance of regular followup and good control of BS BP and Lipids to avoid future complications. 4. Return for an appointment for [address] [place] [address] with Dr. Azeem Contreras. None

## 2023-05-10 NOTE — PATIENT DISCUSSION
Post-Op Instructions: The patient was instructed in the proper use of post-operative eye drops: pred-gati-brom in the surgical eye 3 times per day for 2 weeks, then 2 times per day for 1 week, then 1 time per day for 1 week, then discontinue. Recommended to the patient to continue PRN vitamins for 90 days or until their 90 days supply is gone. Call back instructions, retinal detachment and endophthalmitis precautions given. 0

## 2023-08-16 ENCOUNTER — FOLLOW UP (OUTPATIENT)
Dept: URBAN - METROPOLITAN AREA CLINIC 29 | Facility: CLINIC | Age: 73
End: 2023-08-16

## 2023-08-16 DIAGNOSIS — E11.9: ICD-10-CM

## 2023-08-16 DIAGNOSIS — H04.123: ICD-10-CM

## 2023-08-16 DIAGNOSIS — Z96.1: ICD-10-CM

## 2023-08-16 DIAGNOSIS — Z98.890: ICD-10-CM

## 2023-08-16 DIAGNOSIS — Z94.7: ICD-10-CM

## 2023-08-16 DIAGNOSIS — H26.491: ICD-10-CM

## 2023-08-16 PROCEDURE — 99214 OFFICE O/P EST MOD 30 MIN: CPT

## 2023-08-16 ASSESSMENT — VISUAL ACUITY
OD_PH: 20/25
OD_CC: 20/40
OS_CC: 20/25-2
OD_BAT: 20/70

## 2023-09-05 ENCOUNTER — SURGERY/PROCEDURE (OUTPATIENT)
Dept: URBAN - METROPOLITAN AREA SURGERY 17 | Facility: SURGERY | Age: 73
End: 2023-09-05

## 2023-09-05 DIAGNOSIS — H26.491: ICD-10-CM

## 2023-09-05 PROCEDURE — 66821 AFTER CATARACT LASER SURGERY: CPT

## 2023-09-11 ENCOUNTER — POST-OP (OUTPATIENT)
Dept: URBAN - METROPOLITAN AREA CLINIC 29 | Facility: CLINIC | Age: 73
End: 2023-09-11

## 2023-09-11 DIAGNOSIS — Z96.1: ICD-10-CM

## 2023-09-11 DIAGNOSIS — Z94.7: ICD-10-CM

## 2023-09-11 DIAGNOSIS — Z98.890: ICD-10-CM

## 2023-09-11 ASSESSMENT — TONOMETRY
OD_IOP_MMHG: 08
OS_IOP_MMHG: 08

## 2023-09-11 ASSESSMENT — VISUAL ACUITY
OS_CC: 20/25
OD_CC: 20/30

## 2024-11-19 NOTE — PATIENT DISCUSSION
-BP mildly elevated today, but pt's dizziness has resolved with the last change made to regimen (stopped hydrochlorothiazide and made lisinopril 20mg bid instead of 40mg daily). Pt still taking toprol 50mg at bedtime. Discussed adding hydrochlorothiazide back at 12.5mg daily but pt would rather leave her regimen as is. Will hold off on further changes at this time.  -BMP stable with electrolytes after changes.  
04/30/2020OS-0.75+1.34079+[id number]/20&nbsp; &nbsp; &nbsp; cj
Anti-reflective
COUNSELING:
DIABETES WITHOUT RETINOPATHY- Continue to keep Blood Sugar under control, any changes pt was told to call office for an appt
DIABETES without RETINOPATHY:  I have talked with the patient about the potential for future development of diabetic retinopathy and the potential for significant visual complications. For this reason, regular follow-up with an ophthalmologist is essential. Vision may fluctuate with blood sugar, so the patient should continue to maintain stable blood sugar.
DRY EYES : Discussed with patient the importance of keeping the eye moist and the symptoms associated with dry eyes including blurry vision, tearing, burning, and pablo sensation. Tear Lab was performed today to evaluate the severity of dryness. Advised patient to minimize use of any fans blowing directly on the face. Advised patient to continue with over the counter artificial tears 2-3 times daily.
Diagnosis:Dry eye syndrome, both sides
Discussed options of placement of punctal plugs verses following. The risks, benefits, and alternatives include anesthesia, bleeding, infection, inflammation. The patient understands and desires to proceed with punctal plugs to improve dryness.
Expiration:04/30/2021
General:
Glasses Prescribed:
Lens Material:
Lens Treatment:
PCO OU:  Not visually significant at this time. Re-eval 1 year. If vision changes call office to make an appointment.
Posterior Capsular Fibrosis Counseling: The diagnosis of posterior capsular fibrosis (PCF), also referred to as a secondary cataract or posterior capsular opacification (PCO), was discussed with the patient. The patient understands that their symptoms and limitations are likely related to this condition.
Procedures:
Progressive - Daily wearOD-0.50+0.7510+2.787771/20&nbsp;SN &nbsp; &nbsp; cj
Punctal plug: LLL
Punctal plug: RLL
Slab Off:No
TAMI OU: USE ARTIFICIAL TEARS 2-3 TIMES A DAY. RETURN FOR FOLLOW-UP AS SCHEDULED.
UV Protection
Vertex Distance:
instructed patient to turn fan off in bedroom at night while sleeping or use a satin sleeping mask .
spherecylinderaxisaddprismvertexVAInt VANVExaminer
Ryne Worthy  Internal Medicine  [address] [place], [place] [address]  Phone: [phone]  Fax: ([phone]  Established Patient  Follow Up Time: Routine

## 2024-12-05 ENCOUNTER — OFFICE VISIT (OUTPATIENT)
Dept: URBAN - METROPOLITAN AREA SURGERY CENTER 4 | Facility: SURGERY CENTER | Age: 74
End: 2024-12-05

## 2025-08-14 ENCOUNTER — LAB OUTSIDE AN ENCOUNTER (OUTPATIENT)
Dept: URBAN - METROPOLITAN AREA CLINIC 63 | Facility: CLINIC | Age: 75
End: 2025-08-14

## 2025-08-14 ENCOUNTER — OFFICE VISIT (OUTPATIENT)
Dept: URBAN - METROPOLITAN AREA CLINIC 63 | Facility: CLINIC | Age: 75
End: 2025-08-14
Payer: MEDICARE

## 2025-08-14 DIAGNOSIS — Z09 CARDIOLOGY FOLLOW-UP ENCOUNTER: ICD-10-CM

## 2025-08-14 DIAGNOSIS — Z86.0101 PERSONAL HISTORY OF ADENOMATOUS AND SERRATED COLON POLYPS: ICD-10-CM

## 2025-08-14 PROCEDURE — 99203 OFFICE O/P NEW LOW 30 MIN: CPT | Performed by: PHYSICIAN ASSISTANT

## 2025-08-14 RX ORDER — COLESEVELAM HYDROCHLORIDE 625 MG/1
TAKE 6 TABLETS BY MOUTH ONE TIME DAILY TABLET, FILM COATED ORAL
Qty: 180 UNSPECIFIED | Refills: 0 | Status: ACTIVE | COMMUNITY

## 2025-08-14 RX ORDER — METFORMIN HCL 1000 MG/1
TABLET, FILM COATED ORAL
Qty: 180 TABLET | Status: ACTIVE | COMMUNITY

## 2025-08-14 RX ORDER — ATORVASTATIN CALCIUM, FILM COATED 20 MG/1
TAKE ONE TABLET BY MOUTH ONE TIME DAILY TABLET ORAL
Qty: 90 UNSPECIFIED | Refills: 0 | Status: ACTIVE | COMMUNITY

## 2025-08-14 RX ORDER — MULTIVIT-MIN/IRON/FOLIC ACID/K 18-600-40
AS DIRECTED CAPSULE ORAL
Status: ACTIVE | COMMUNITY

## 2025-08-14 RX ORDER — ASPIRIN 81 MG/1
TABLET, CHEWABLE ORAL ONCE A DAY
Refills: 0 | Status: ACTIVE | COMMUNITY
Start: 2017-11-07

## 2025-08-14 RX ORDER — VENLAFAXINE HYDROCHLORIDE 150 MG/1
TAKE ONE CAPSULE BY MOUTH ONE TIME DAILY WITH FOOD CAPSULE, EXTENDED RELEASE ORAL
Qty: 90 UNSPECIFIED | Refills: 0 | Status: ACTIVE | COMMUNITY

## 2025-08-14 RX ORDER — METOPROLOL TARTRATE 25 MG/1
TAKE ONE TABLET BY MOUTH TWICE A DAY TABLET, FILM COATED ORAL
Qty: 180 UNSPECIFIED | Refills: 2 | Status: ACTIVE | COMMUNITY